# Patient Record
(demographics unavailable — no encounter records)

---

## 2024-11-11 NOTE — PLAN
[FreeTextEntry1] : HIGH FLU VACCINE ADMINISTERED ON LEFT DELTOID IM, VIS PROVIDED AND WELL TOLERATED. PT INST. POST VACCINE INST. DISCUSS

## 2024-11-16 NOTE — ASSESSMENT
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   Thank you for this Mohs surgery referral.  Ju Smith MD Physician, Dermatology & Dermatologic Surgery [facility].

## 2024-11-16 NOTE — PHYSICAL EXAM
[Alert] : alert [Oriented x 3] : ~L oriented x 3 [Well Nourished] : well nourished [Conjunctiva Non-injected] : conjunctiva non-injected [No Visual Lymphadenopathy] : no visual  lymphadenopathy [No Clubbing] : no clubbing [No Edema] : no edema [No Bromhidrosis] : no bromhidrosis [No Chromhidrosis] : no chromhidrosis [Hair] : Hair [Scalp] : Scalp [Face] : Face [Nose] : Nose [Eyelids] : Eyelids [Ears] : Ears [Lips] : Lips [Neck] : Neck [Nodes] : Nodes [FreeTextEntry3] : Right scapha - crusted thin plaque 0.9 x 1.0 cm No cervical LAD

## 2024-11-16 NOTE — HISTORY OF PRESENT ILLNESS
[FreeTextEntry1] : SCC Right scapha [de-identified] : 11/15/2024   Referred by: Dr. Lyn   We had the pleasure of seeing your patient in consultation for Mohs Micrographic Surgery.  Ms. YESI CLAY is a 82 year old F who presents for SCC of the Right scapha. Present for a few months prior to biopsy. No pain, but was scaly. Biopsy showed SCC in situ, extending to the base of bx and cannot r/o invasive SCC. No treatment to date.    Pertinent positives noted below History of HIV or hepatitis: No Blood thinners: None Antibiotic Prophylaxis: None Medical implants: None   The patient's review of systems questionnaire was reviewed. Education needs were identified. There were no barriers to learning.   Mohs surgery consultation for SCC at least in situ  Right scapha   -- I explained the treatment options of topical immunomodulatory or chemotherapy, electrodessication and curettage, radiation therapy, excision and Mohs surgery. I recommended Mohs surgery due to the tumor type, location, and ill-defined nature of cancer.   Mohs Appropriate Use Criteria (AUC) Score: 7   I explained that following extirpation there will be a full thickness defect of the involved area. The reconstructive options will be based on the defect size and surrounding tissue laxity of the involved area. Primary closure is only possible for smaller defects. For larger defects, local tissue rearrangement or skin grafting may be necessary. Risks following layered primary closure or local tissue rearrangement include wound dehiscence, contour irregularity, bleeding, infection, and paresthesia (nerve damage including sensory deficit or motor weakness). Risks following skin grafting include wound dehiscence, skin graft nonadherence (partial or complete), contour irregularity, bleeding, infection, paresthesia, and donor site complications. I explained that the patient will need to abstain from physical activity for 1-2 weeks following the surgery, that they would heal with a scar, and also discussed the chances of infection, bleeding, potential sensory or motor nerve damage, and recurrence. The patient indicated that s/he understood the risks and wished to proceed today -- In particular, for reconstruction we discussed repair with partial closure vs second intent   Thank you for this Mohs surgery referral. We recommended that Ms. YESI CLAY follow up with Her referring dermatologist for routine skin exams following surgery.

## 2024-11-16 NOTE — HISTORY OF PRESENT ILLNESS
[FreeTextEntry1] : SCC Right scapha [de-identified] : 11/15/2024   Referred by: Dr. Lyn   We had the pleasure of seeing your patient in consultation for Mohs Micrographic Surgery.  Ms. YESI CLAY is a 82 year old F who presents for SCC of the Right scapha. Present for a few months prior to biopsy. No pain, but was scaly. Biopsy showed SCC in situ, extending to the base of bx and cannot r/o invasive SCC. No treatment to date.    Pertinent positives noted below History of HIV or hepatitis: No Blood thinners: None Antibiotic Prophylaxis: None Medical implants: None   The patient's review of systems questionnaire was reviewed. Education needs were identified. There were no barriers to learning.   Mohs surgery consultation for SCC at least in situ  Right scapha   -- I explained the treatment options of topical immunomodulatory or chemotherapy, electrodessication and curettage, radiation therapy, excision and Mohs surgery. I recommended Mohs surgery due to the tumor type, location, and ill-defined nature of cancer.   Mohs Appropriate Use Criteria (AUC) Score: 7   I explained that following extirpation there will be a full thickness defect of the involved area. The reconstructive options will be based on the defect size and surrounding tissue laxity of the involved area. Primary closure is only possible for smaller defects. For larger defects, local tissue rearrangement or skin grafting may be necessary. Risks following layered primary closure or local tissue rearrangement include wound dehiscence, contour irregularity, bleeding, infection, and paresthesia (nerve damage including sensory deficit or motor weakness). Risks following skin grafting include wound dehiscence, skin graft nonadherence (partial or complete), contour irregularity, bleeding, infection, paresthesia, and donor site complications. I explained that the patient will need to abstain from physical activity for 1-2 weeks following the surgery, that they would heal with a scar, and also discussed the chances of infection, bleeding, potential sensory or motor nerve damage, and recurrence. The patient indicated that s/he understood the risks and wished to proceed today -- In particular, for reconstruction we discussed repair with partial closure vs second intent   Thank you for this Mohs surgery referral. We recommended that Ms. YESI CLAY follow up with Her referring dermatologist for routine skin exams following surgery.

## 2024-11-16 NOTE — ASSESSMENT
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   Thank you for this Mohs surgery referral.  Ju Smith MD Physician, Dermatology & Dermatologic Surgery [facility].

## 2024-11-16 NOTE — END OF VISIT
[] : Fellow [FreeTextEntry3] : I personally saw and examined this patient with the fellow physician and was present for the key portions of history taking, examination as well as any procedures performed .  I agree with the assessment and plan as documented in the fellow's note unless noted below.   Ju Smith MD Physician, Dermatology and Dermatologic Surgery [facility]

## 2024-11-16 NOTE — END OF VISIT
[] : Fellow [FreeTextEntry3] : I personally saw and examined this patient with the fellow physician and was present for the key portions of history taking, examination as well as any procedures performed .  I agree with the assessment and plan as documented in the fellow's note unless noted below.   Ju Smith MD Physician, Dermatology and Dermatologic Surgery [facility]

## 2024-11-16 NOTE — PROCEDURE
[TextEntry] : Mohs Surgery Procedure Note   A surgical time out was taken to confirm the patient's correct identity and the correct site. The operative site was identified by the patient and surgical team prior to surgery and the patient agreed to proceed with the surgical site which was marked prior to anesthesia infiltration.   Mohs Micrographic Surgery Report Date Collected: 11/15/2024 Date Received: Same as above Date Verified: Same as above Attending Surgeon: Ju Smith MD Fellow: Briseida Clark MD Assistants: Kezia Yousif RN, Avani Stinson MA Procedure#:  Diagnosis: SCC in situ Location: Right scapha Pre-op Size: 0.9 cm x 1.0cm Post-Operative Final Defect Size: 1.3 cm x 1.2 cm Stages (number of pieces per stage): 1 (2/1) Pathology, if tumor noted in stages: Debulk with full thickness epidermal keratinocyte atypia consistent with SCC in situ. Stage I with Sparse perivascular lymphocytic infiltrate without evidence of residual carcinoma on sections examined Indications for procedure: Ill-defined tumor margins, high-priority anatomic location for preservation of normal tissue, aggressive histologic nature of the tumor Repair type: Partial intermediate linear layered Subcutaneous and dermal sutures used:4-0 Vicryl.     Epidermal sutures: 5-0 prolene Total volume of anesthesia: 10 mL  Repair length: 1.5 cm   Mohs Procedure Report:   The patient was escorted to the outpatient surgical operatory. The proposed Mohs procedure and reconstruction options were discussed with the patient. The risks, benefits, and alternatives were discussed and the patient signed a written consent form. A time out was taken to confirm the patient's identity and the exact location of the skin cancer. After the patient was placed in a recumbent position, the surgical site was cleaned with alcohol and either Betadine (for eyes and ears) or chlorhexidine gluconate, draped, and infiltrated with 0.5% lidocaine with 1:200,000 epinephrine local anesthetic. A sterile surgical marking pen was used to outline a thin margin of normal-appearing skin around the tumor. A beveled incision was then made using a scalpel. Small orienting nicks were made on the specimen and the surrounding skin. The tissue was then sharply dissected from the surrounding skin. Hemostasis was maintained with the electrosurgical unit and/or pressure. A temporary dressing was placed on the surgical defect until the frozen section slides were interpreted. The oriented specimen was placed in a Bev dish and transported to the Mohs laboratory. For each stage of the procedure, a visual representation of the specimen was drawn on a Mohs map. This map graphically depicts the specimen's two-dimensional appearance, orientation, and tissue preparation, which consists of dividing the specimen and applying tissue dyes. Because the deep and peripheral portions of the tissue are then embedded in the same geometric plane, the map also represents an oriented scale drawing of the resulting histologic sections. The Mohs technician then prepared frozen section slides using standard techniques. The slides were stained with hematoxylin and eosin, and cover slips were applied. The frozen section slides were then examined under the microscope. If tumor was found, it was localized on the map. The orienting nicks on the original specimen corresponded to similar nicks on the surgical defect so areas of identified tumor could be mapped back to the patient and resected. Additional layers of removed skin were then processed as indicated above. This iterative process continued as applicable until no tumor was observed microscopically. At this stage, the Mohs resection was complete.  RECONSTRUCTION PROCEDURE NOTE PARTIAL INTERMEDIATE LINEAR LAYERED CLOSURE   Prior to beginning the procedure, patient identity was verified, as well as the procedure to be performed and the site.  All equipment required was ready and available. The patient was positioned appropriately.   INDICATIONS:  Various reconstructive modalities were discussed with the patient, and it was decided that an intermediate linear layered closure would best preserve normal anatomical and functional relationships. After a discussion of the risks including but not limited to bleeding, scarring, infection, nerve damage, unsatisfactory results, and wound dehiscense, informed consent was obtained, and the patient underwent the procedure as follows.   PROCEDURE:  The patient was taken to the operative suite and placed supine on the operating room table. The area was anesthetized with 1% Lidocaine with 1/100,000 epinephrine. The area was washed with Chlorhexidine or Betadine, rinsed with sterile saline, and draped with sterile towels. The wound edges were debeveled, and the wound was undermined widely in all directions if needed. Hemostasis was obtained with spot electrodessication if needed. Deep dermal and subcutaneous closure was performed using the indicated buried sutures.  Using a 15 blade scalpel, redundant cones were removed as Burow's triangles to align with the relaxed skin tension lines in a curvilinear fashion if needed. The epidermis was closed and approximated using the indicated sutures. The final wound length is noted above. A sterile pressure dressing was applied, and wound care instructions were given.   FINAL PROCEDURE: Intermediate linear layered closure   COMPLICATIONS: None

## 2024-11-26 NOTE — HISTORY OF PRESENT ILLNESS
[FreeTextEntry1] : Wound Check s/p Mohs and partial closure for SCC Right scapha 11/15/24 [de-identified] : 11/25/2024    Ms. YESI CLAY is a 82 year old F who presents for  follow up s/p Mohs as above. Has been doing well and keeping the wound covered. No pain or bleeding.  Patient denies fever, chills, night sweats, unintentional weight loss or other constitutional symptoms

## 2024-11-26 NOTE — ASSESSMENT
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   Thank you for this Mohs surgery referral.  Ju Smith MD Physician, Dermatology & Dermatologic Surgery [facility].

## 2025-05-05 NOTE — ASSESSMENT
[FreeTextEntry1] : This is an 81 y/o female with PMHx significant for HTN, MTHFR gene mutation  PULM / HEME / CVS: PE with RV strain, HTN, MTHFR mutation -OFF Eliquis -Hematology seen by Dr. Canales -BP stable, continue Lisinopril daily -Cardiology referral for Dr Taylor, Echo-negative  HTN - Lisinopril 10mg  GERD -Protonix 40mg  HLD - Simvastatin 20mg  HCM: - CPE 05/2024> next visit for AWV -DEXA 2023 normal - Mammo 10/23 -Colonoscopy: 2019 -Immunizations: PCV 20 UTD Refused shingrix.  Order for blood test outpt

## 2025-05-05 NOTE — HISTORY OF PRESENT ILLNESS
[FreeTextEntry1] : med refill [de-identified] : This is an 81y/o female with PMHx: HTN, MTHFR gene mutation with PE in the past, . Hx bilateral lobar and segmental pulmonary emboli with severe RV dysfunction following a 3-hour car trip on 02/20/23 Lower extremity duplex sonography on 02/21/23 revealed left peroneal vein DVT. She was treated with heparin and completed treatment with Eliquis. Hematology consultation done by Dr. Canales Cardiology seen by Brandon Past history is pertinent for hypertension and GERD Pt is in good mood and has no current complaints Family history is negative for bleeding or clotting disorders.